# Patient Record
Sex: MALE | Race: BLACK OR AFRICAN AMERICAN
[De-identification: names, ages, dates, MRNs, and addresses within clinical notes are randomized per-mention and may not be internally consistent; named-entity substitution may affect disease eponyms.]

---

## 2020-08-29 ENCOUNTER — HOSPITAL ENCOUNTER (EMERGENCY)
Dept: HOSPITAL 35 - ER | Age: 20
Discharge: HOME | End: 2020-08-29
Payer: COMMERCIAL

## 2020-08-29 VITALS — WEIGHT: 156 LBS | BODY MASS INDEX: 21.84 KG/M2 | HEIGHT: 71 IN

## 2020-08-29 VITALS — SYSTOLIC BLOOD PRESSURE: 100 MMHG | DIASTOLIC BLOOD PRESSURE: 63 MMHG

## 2020-08-29 DIAGNOSIS — E86.0: ICD-10-CM

## 2020-08-29 DIAGNOSIS — R00.1: ICD-10-CM

## 2020-08-29 DIAGNOSIS — R00.2: Primary | ICD-10-CM

## 2020-08-29 DIAGNOSIS — F12.10: ICD-10-CM

## 2020-08-29 LAB
ALBUMIN SERPL-MCNC: 4.2 G/DL (ref 3.4–5)
ALT SERPL-CCNC: 19 U/L (ref 30–65)
ANION GAP SERPL CALC-SCNC: 9 MMOL/L (ref 7–16)
AST SERPL-CCNC: 20 U/L (ref 15–37)
BASOPHILS NFR BLD AUTO: 0.3 % (ref 0–2)
BILIRUB SERPL-MCNC: 0.8 MG/DL (ref 0.2–1)
BILIRUB UR-MCNC: NEGATIVE MG/DL
BUN SERPL-MCNC: 7 MG/DL (ref 7–18)
CALCIUM SERPL-MCNC: 9 MG/DL (ref 8.5–10.1)
CHLORIDE SERPL-SCNC: 102 MMOL/L (ref 98–107)
CO2 SERPL-SCNC: 27 MMOL/L (ref 21–32)
COLOR UR: YELLOW
CREAT SERPL-MCNC: 1.1 MG/DL (ref 0.7–1.3)
EOSINOPHIL NFR BLD: 0.1 % (ref 0–3)
ERYTHROCYTE [DISTWIDTH] IN BLOOD BY AUTOMATED COUNT: 13.7 % (ref 10.5–14.5)
GLUCOSE SERPL-MCNC: 116 MG/DL (ref 74–106)
GRANULOCYTES NFR BLD MANUAL: 76.5 % (ref 36–66)
HCT VFR BLD CALC: 44.7 % (ref 42–52)
HGB BLD-MCNC: 15 GM/DL (ref 14–18)
KETONES UR STRIP-MCNC: (no result) MG/DL
LYMPHOCYTES NFR BLD AUTO: 18.2 % (ref 24–44)
MCH RBC QN AUTO: 30.7 PG (ref 26–34)
MCHC RBC AUTO-ENTMCNC: 33.6 G/DL (ref 28–37)
MCV RBC: 91.2 FL (ref 80–100)
MONOCYTES NFR BLD: 4.9 % (ref 1–8)
NEUTROPHILS # BLD: 4 THOU/UL (ref 1.4–8.2)
PLATELET # BLD: 119 THOU/UL (ref 150–400)
POTASSIUM SERPL-SCNC: 3.9 MMOL/L (ref 3.5–5.1)
PROT SERPL-MCNC: 7.2 G/DL (ref 6.4–8.2)
RBC # BLD AUTO: 4.9 MIL/UL (ref 4.5–6)
RBC # UR STRIP: NEGATIVE /UL
SODIUM SERPL-SCNC: 138 MMOL/L (ref 136–145)
SP GR UR STRIP: 1.02 (ref 1–1.03)
TROPONIN I SERPL-MCNC: <0.06 NG/ML (ref ?–0.06)
URINE CLARITY: (no result)
URINE GLUCOSE-RANDOM*: NEGATIVE
URINE LEUKOCYTES-REFLEX: NEGATIVE
URINE NITRITE-REFLEX: NEGATIVE
URINE PROTEIN (DIPSTICK): NEGATIVE
UROBILINOGEN UR STRIP-ACNC: 1 E.U./DL (ref 0.2–1)
WBC # BLD AUTO: 5.3 THOU/UL (ref 4–11)

## 2020-08-31 NOTE — EKG
[facility]
[address] [place]   [address]                     ELECTROCARDIOGRAM REPORT      
_______________________________________________________________________________
 
Name:       GAVIN MARIE               Room #:                     [facility]#:      [id number]                       Account #:      [id number]  
Admission:  20    Attend Phys:                          
Discharge:  20    Date of Birth:  [birth year]  
                                                          Report #: [id number]
                                                                    [id number]
_______________________________________________________________________________
THIS REPORT FOR:  
 
cc:  ASHLEY - Herminia family physician/PCP 
     ASHLEY - Herminia family physician/PCP 
     Daniele Milligan MD [facility]
THIS REPORT FOR:   //name//                          
 
                         [facility] ED
                                       
Test Date:    2020               Test Time:    15:21:13
Pat Name:     GAVIN MARIE            Department:   
Patient ID:   [id number]            Room:          
Gender:                               Technician:   
:          2000               Requested By: Deb Chowdhury
Order Number: [id number] MD:   Daniele Milligan
                                 Measurements
Intervals                              Axis          
Rate:         47                       P:            57
MT:           185                      QRS:          80
QRSD:         101                      T:            42
QT:           415                                    
QTc:          367                                    
                           Interpretive Statements
Sinus bradycardia
Right ventricular conduction delay
Compared to ECG 2020 13:26:43
Heart rate has slowed
Electronically Signed On 2020 8:12:42 CDT by Daniele Milligan
[email or web address]
 
 
 
 
 
 
 
 
 
 
 
 
 
 
 
  <ELECTRONICALLY SIGNED>
   By: Daniele Milligan MD, FACC   
  20     0812
D: 20 1521                           _____________________________________
T: 20 1521                           Daniele Milligan MD, [facility]     /EPI

## 2020-08-31 NOTE — EKG
[facility]
[address]
[place]   [address]                     ELECTROCARDIOGRAM REPORT      
_______________________________________________________________________________
 
Name:       GAVIN MARIE               Room #:                     [facility]#:      [id number]                       Account #:      [id number]  
Admission:  20    Attend Phys:                          
Discharge:  20    Date of Birth:  [birth year]  
                                                          Report #: [id number]
                                                                    [id number]
_______________________________________________________________________________
THIS REPORT FOR:  
 
cc:  ASHLEY - Herminia family physician/PCP 
     ASHLEY - Herminia family physician/PCP 
     Daniele Milligan MD [facility]
THIS REPORT FOR:   //name//                          
 
                         [facility] ED
                                       
Test Date:    2020               Test Time:    13:26:43
Pat Name:     GAVIN MARIE            Department:   
Patient ID:   [id number]            Room:          
Gender:                               Technician:   SHAYY BRAGA
:          2000               Requested By: Deb Chowdhury
Order Number: [id number] MD:   Daniele Milligan
                                 Measurements
Intervals                              Axis          
Rate:         70                       P:            43
OR:           181                      QRS:          74
QRSD:         122                      T:            36
QT:           398                                    
QTc:          430                                    
                           Interpretive Statements
Sinus rhythm
Right ventricular conduction delay
No previous ECG available for comparison
Electronically Signed On 2020 8:05:30 CDT by Daniele Milligan
[email or web address]
 
 
 
 
 
 
 
 
 
 
 
 
 
 
 
 
  <ELECTRONICALLY SIGNED>
   By: Daniele Milligan MD, FAC   
  20     08
D: 20 1326                           _____________________________________
T: 206                           Daniele Milligan MD, [facility]     /EPI